# Patient Record
Sex: FEMALE | Race: WHITE | NOT HISPANIC OR LATINO | Employment: UNEMPLOYED | URBAN - METROPOLITAN AREA
[De-identification: names, ages, dates, MRNs, and addresses within clinical notes are randomized per-mention and may not be internally consistent; named-entity substitution may affect disease eponyms.]

---

## 2023-05-02 ENCOUNTER — APPOINTMENT (OUTPATIENT)
Dept: MRI IMAGING | Facility: CLINIC | Age: 53
End: 2023-05-02
Attending: EMERGENCY MEDICINE
Payer: COMMERCIAL

## 2023-05-02 ENCOUNTER — TRANSFERRED RECORDS (OUTPATIENT)
Dept: HEALTH INFORMATION MANAGEMENT | Facility: CLINIC | Age: 53
End: 2023-05-02

## 2023-05-02 ENCOUNTER — HOSPITAL ENCOUNTER (EMERGENCY)
Facility: CLINIC | Age: 53
Discharge: HOME | End: 2023-05-02
Attending: EMERGENCY MEDICINE | Admitting: EMERGENCY MEDICINE
Payer: COMMERCIAL

## 2023-05-02 VITALS
DIASTOLIC BLOOD PRESSURE: 85 MMHG | BODY MASS INDEX: 33.66 KG/M2 | WEIGHT: 190 LBS | SYSTOLIC BLOOD PRESSURE: 172 MMHG | TEMPERATURE: 98 F | HEART RATE: 84 BPM | OXYGEN SATURATION: 98 % | RESPIRATION RATE: 16 BRPM | HEIGHT: 63 IN

## 2023-05-02 DIAGNOSIS — H47.11 PAPILLEDEMA ASSOCIATED WITH INCREASED INTRACRANIAL PRESSURE: ICD-10-CM

## 2023-05-02 DIAGNOSIS — G93.2 IDIOPATHIC INTRACRANIAL HYPERTENSION: Primary | ICD-10-CM

## 2023-05-02 DIAGNOSIS — H53.9 VISION CHANGES: ICD-10-CM

## 2023-05-02 LAB
ANION GAP SERPL CALCULATED.3IONS-SCNC: 15 MMOL/L (ref 7–15)
ATRIAL RATE - MUSE: 65 BPM
BASOPHILS # BLD AUTO: 0 10E3/UL (ref 0–0.2)
BASOPHILS NFR BLD AUTO: 1 %
BUN SERPL-MCNC: 12.7 MG/DL (ref 6–20)
CALCIUM SERPL-MCNC: 10 MG/DL (ref 8.6–10)
CHLORIDE SERPL-SCNC: 103 MMOL/L (ref 98–107)
CREAT SERPL-MCNC: 0.53 MG/DL (ref 0.51–0.95)
DEPRECATED HCO3 PLAS-SCNC: 20 MMOL/L (ref 22–29)
DIASTOLIC BLOOD PRESSURE - MUSE: NORMAL MMHG
EOSINOPHIL # BLD AUTO: 0.1 10E3/UL (ref 0–0.7)
EOSINOPHIL NFR BLD AUTO: 2 %
ERYTHROCYTE [DISTWIDTH] IN BLOOD BY AUTOMATED COUNT: 13 % (ref 10–15)
GFR SERPL CREATININE-BSD FRML MDRD: >90 ML/MIN/1.73M2
GLUCOSE SERPL-MCNC: 105 MG/DL (ref 70–99)
HCT VFR BLD AUTO: 40.5 % (ref 35–47)
HGB BLD-MCNC: 13.3 G/DL (ref 11.7–15.7)
IMM GRANULOCYTES # BLD: 0 10E3/UL
IMM GRANULOCYTES NFR BLD: 0 %
INTERPRETATION ECG - MUSE: NORMAL
LYMPHOCYTES # BLD AUTO: 2.5 10E3/UL (ref 0.8–5.3)
LYMPHOCYTES NFR BLD AUTO: 42 %
MCH RBC QN AUTO: 28.2 PG (ref 26.5–33)
MCHC RBC AUTO-ENTMCNC: 32.8 G/DL (ref 31.5–36.5)
MCV RBC AUTO: 86 FL (ref 78–100)
MONOCYTES # BLD AUTO: 0.3 10E3/UL (ref 0–1.3)
MONOCYTES NFR BLD AUTO: 4 %
NEUTROPHILS # BLD AUTO: 3.1 10E3/UL (ref 1.6–8.3)
NEUTROPHILS NFR BLD AUTO: 51 %
NRBC # BLD AUTO: 0 10E3/UL
NRBC BLD AUTO-RTO: 0 /100
P AXIS - MUSE: 49 DEGREES
PLATELET # BLD AUTO: 286 10E3/UL (ref 150–450)
POTASSIUM SERPL-SCNC: 4.4 MMOL/L (ref 3.4–5.3)
PR INTERVAL - MUSE: 146 MS
QRS DURATION - MUSE: 82 MS
QT - MUSE: 410 MS
QTC - MUSE: 426 MS
R AXIS - MUSE: 69 DEGREES
RBC # BLD AUTO: 4.71 10E6/UL (ref 3.8–5.2)
SODIUM SERPL-SCNC: 138 MMOL/L (ref 136–145)
SYSTOLIC BLOOD PRESSURE - MUSE: NORMAL MMHG
T AXIS - MUSE: 43 DEGREES
TROPONIN T SERPL HS-MCNC: <6 NG/L
VENTRICULAR RATE- MUSE: 65 BPM
WBC # BLD AUTO: 6 10E3/UL (ref 4–11)

## 2023-05-02 PROCEDURE — 255N000002 HC RX 255 OP 636: Performed by: EMERGENCY MEDICINE

## 2023-05-02 PROCEDURE — 85025 COMPLETE CBC W/AUTO DIFF WBC: CPT | Performed by: EMERGENCY MEDICINE

## 2023-05-02 PROCEDURE — 36415 COLL VENOUS BLD VENIPUNCTURE: CPT | Performed by: EMERGENCY MEDICINE

## 2023-05-02 PROCEDURE — 250N000013 HC RX MED GY IP 250 OP 250 PS 637: Performed by: EMERGENCY MEDICINE

## 2023-05-02 PROCEDURE — 70549 MR ANGIOGRAPH NECK W/O&W/DYE: CPT

## 2023-05-02 PROCEDURE — 70553 MRI BRAIN STEM W/O & W/DYE: CPT

## 2023-05-02 PROCEDURE — 70544 MR ANGIOGRAPHY HEAD W/O DYE: CPT | Mod: XU

## 2023-05-02 PROCEDURE — 93005 ELECTROCARDIOGRAM TRACING: CPT

## 2023-05-02 PROCEDURE — 84484 ASSAY OF TROPONIN QUANT: CPT | Performed by: EMERGENCY MEDICINE

## 2023-05-02 PROCEDURE — A9585 GADOBUTROL INJECTION: HCPCS | Performed by: EMERGENCY MEDICINE

## 2023-05-02 PROCEDURE — 99285 EMERGENCY DEPT VISIT HI MDM: CPT | Mod: 25

## 2023-05-02 PROCEDURE — 80048 BASIC METABOLIC PNL TOTAL CA: CPT | Performed by: EMERGENCY MEDICINE

## 2023-05-02 PROCEDURE — 70546 MR ANGIOGRAPH HEAD W/O&W/DYE: CPT

## 2023-05-02 RX ORDER — ACETAZOLAMIDE 500 MG/1
500 CAPSULE, EXTENDED RELEASE ORAL 2 TIMES DAILY
Qty: 28 CAPSULE | Refills: 0 | Status: SHIPPED | OUTPATIENT
Start: 2023-05-02 | End: 2023-06-09

## 2023-05-02 RX ORDER — LORAZEPAM 1 MG/1
1 TABLET ORAL ONCE
Status: COMPLETED | OUTPATIENT
Start: 2023-05-02 | End: 2023-05-02

## 2023-05-02 RX ORDER — GADOBUTROL 604.72 MG/ML
10 INJECTION INTRAVENOUS ONCE
Status: COMPLETED | OUTPATIENT
Start: 2023-05-02 | End: 2023-05-02

## 2023-05-02 RX ADMIN — LORAZEPAM 1 MG: 1 TABLET ORAL at 12:07

## 2023-05-02 RX ADMIN — GADOBUTROL 10 ML: 604.72 INJECTION INTRAVENOUS at 15:06

## 2023-05-02 ASSESSMENT — ENCOUNTER SYMPTOMS
HEADACHES: 1
WEAKNESS: 0
SHORTNESS OF BREATH: 0
NUMBNESS: 0
DIARRHEA: 0
NAUSEA: 0
VOMITING: 0

## 2023-05-02 ASSESSMENT — ACTIVITIES OF DAILY LIVING (ADL)
ADLS_ACUITY_SCORE: 35
ADLS_ACUITY_SCORE: 35

## 2023-05-02 NOTE — ED TRIAGE NOTES
Pt has been having issues with her vision for past couple of weeks.  Eye doctor today sent her here for MRI, finding swelling in right eye.  Has had headaches for 2 weeks and dropping things.     Triage Assessment     Row Name 05/02/23 1120       Triage Assessment (Adult)    Airway WDL WDL       Respiratory WDL    Respiratory WDL WDL       Skin Circulation/Temperature WDL    Skin Circulation/Temperature WDL WDL       Peripheral/Neurovascular WDL    Peripheral Neurovascular WDL WDL       Cognitive/Neuro/Behavioral WDL    Cognitive/Neuro/Behavioral WDL WDL

## 2023-05-02 NOTE — ED PROVIDER NOTES
History     Chief Complaint:  Eye Problem     The history is provided by the patient.      Chacha Mae is a 52 year old female with a history of hypertension and hyperlipidemia who presents with vision changes which have gradually worsened since the onset 3 weeks ago. She reports that approximately 3 weeks ago, she began noticing that her glasses were not working as well as they had been. She followed up with her optometrist for this yesterday, and imaging studies were performed at that time. The imaging studies reportedly showed evidence of bilateral optic nerve swelling, right greater than left. She was instructed to follow up with ophthalmology today which she did this morning at [facility], and she was sent from there to the ED for MRI imaging. Presently, she reports that her vision is blurred bilaterally, right greater than left. She notes experiencing intermittent headaches for the past 3 weeks as well, but she attributes this to starting an intermittent fasting diet 4 weeks ago. She also mentions that she has had some chest pain which she attributes to anxiety. She has had no chest pain today. Denies shortness of breath, nausea, vomiting, diarrhea, numbness/tingling, weakness. She endorses history of hyperlipidemia for which she takes a statin medication but denies history of hypertension, MI or heart disease, or stroke.    Independent Historian:    None - Patient Only    ROS:  Review of Systems   Eyes: Positive for visual disturbance.   Respiratory: Negative for shortness of breath.    Cardiovascular: Negative for chest pain.   Gastrointestinal: Negative for diarrhea, nausea and vomiting.   Neurological: Positive for headaches. Negative for weakness and numbness.   All other systems reviewed and are negative.    Allergies:  Tilactase    Medications:    Adderall  Lipitor    Past Medical History:    Depression  ADHD  IBS with constipation  Obesity  Hyperlipidemia  PTSD  Hypertension   Mitral  "valve disorders  Cardiac murmur  Adjustment disorder with depressed mood  Intussusception of intestine  Chronic nausea  Migraine    Past Surgical History:    [place] teeth extraction      Family History:    Mother: breast cancer, hyperlipidemia, diabetes  Father: leukemia, bladder cancer, hypertension, hyperlipidemia, diabetes, mitral valve prolapse, heart murmur    Social History:  The patient presents to the ED with a friend.  The patient presents to the ED via private car.     Physical Exam     Patient Vitals for the past 24 hrs:   BP Temp Pulse Resp SpO2 Height Weight   05/02/23 1119 (!) 172/85 98  F (36.7  C) 84 16 98 % 1.6 m (5' 3\") 86.2 kg (190 lb)      Physical Exam  General: Alert, appears well-developed and well-nourished. Cooperative.     In mild distress  HEENT:  Head:  Atraumatic  Ears:  External ears are normal  Mouth/Throat:  Oropharynx is without erythema or exudate and mucous membranes are moist.   Eyes:   Conjunctivae normal and EOM are normal. No scleral icterus.    Pupils are equal, round, and reactive to light.   Neck:   Normal range of motion. Neck supple.  CV:  Normal rate, regular rhythm, normal heart sounds and radial pulses are 2+ and symmetric.  No murmur.  Resp:  Breath sounds are clear bilaterally    Non-labored, no retractions or accessory muscle use  GI:  Abdomen is soft, no distension, no tenderness. No rebound or guarding.    MS:  Normal range of motion. No edema.    Normal strength in all 4 extremities.     Back atraumatic.    No midline cervical, thoracic, or lumbar tenderness  Skin:  Warm and dry.  No rash or lesions noted.  Neuro: Alert. Normal strength.  Sensation intact in all 4 extremities. GCS: 15.  Cranial nerves 2-12 intact.  Psych:  Normal mood and affect.    Emergency Department Course     ECG  ECG taken at 1156, ECG read at 1211  Normal sinus rhythm  Cannot rule out anterior infarct, age undetermined  Abnormal ECG  No significant change as compared to prior, dated " 11/24/2015.  Rate 65 bpm. UT interval 146 ms. QRS duration 82 ms. QT/QTc 410/426 ms. P-R-T axes 49 69 43.    Imaging:  MRA Neck (Carotids) wo & w Contrast   Final Result   IMPRESSION: Unremarkable MRA of the neck.      SLAVA QUINTANILLA MD            SYSTEM ID:  [id number]      MR Brain and Orbits w/o & w Contrast   Final Result   IMPRESSION:   1. Partially empty sella turcica, distention of the bilateral optic   nerve sheaths, and bilateral transverse sinus narrowing raises concern   for idiopathic intracranial hypertension. Further workup could be   pursued.   2. Otherwise, unremarkable MRI of the brain and orbits.      SLAVA QUINTANILLA MD            SYSTEM ID:  [id number]      MRV Brain wo & w Contrast   Final Result   IMPRESSION: Bilateral transverse sinus narrowing raises concern for   idiopathic intracranial hypertension.      SLAVA QUINTANILLA MD            SYSTEM ID:  [id number]      MRA Brain ([place]) wo Contrast   Final Result   IMPRESSION: Unremarkable MRA of the head.      SLAVA QUINTANILLA MD            SYSTEM ID:  [id number]      Report per radiology    Laboratory:  Labs Ordered and Resulted from Time of ED Arrival to Time of ED Departure   BASIC METABOLIC PANEL - Abnormal       Result Value    Sodium 138      Potassium 4.4      Chloride 103      Carbon Dioxide (CO2) 20 (*)     Anion Gap 15      Urea Nitrogen 12.7      Creatinine 0.53      Calcium 10.0      Glucose 105 (*)     GFR Estimate >90     TROPONIN T, HIGH SENSITIVITY - Normal    Troponin T, High Sensitivity <6     CBC WITH PLATELETS AND DIFFERENTIAL    WBC Count 6.0      RBC Count 4.71      Hemoglobin 13.3      Hematocrit 40.5      MCV 86      MCH 28.2      MCHC 32.8      RDW 13.0      Platelet Count 286      % Neutrophils 51      % Lymphocytes 42      % Monocytes 4      % Eosinophils 2      % Basophils 1      % Immature Granulocytes 0      NRBCs per 100 WBC 0      Absolute Neutrophils 3.1      Absolute Lymphocytes 2.5      Absolute Monocytes 0.3       Absolute Eosinophils 0.1      Absolute Basophils 0.0      Absolute Immature Granulocytes 0.0      Absolute NRBCs 0.0       Emergency Department Course & Assessments:       Interventions:  Medications   LORazepam (ATIVAN) tablet 1 mg (1 mg Oral $Given 5/2/23 1207)   gadobutrol (GADAVIST) injection 10 mL (10 mLs Intravenous $Given 5/2/23 1506)      Independent Interpretation (X-rays, CTs, rhythm strip):  None     Consultations/Discussion of Management or Tests:  1515 I spoke with Dr. Stallings from neuroradiology regarding the patient's imaging findings.  1525 I spoke with Dr. Matute from neurology regarding the patient's presentation and plan of care. Dr. Matute recommqends neuro-ophthalmology consultation.  1547 I spoke with Dr. Ventura from ophthalmology regarding the patient's presentation and plan of care.     Social Determinants of Health affecting care:  None    Assessments:  1142 I obtained history and performed an examination of the patient.   1612 The patient was rechecked and updated. Discussed results and plan of care.     Disposition:  The patient was discharged to home.     Impression & Plan      Medical Decision Making:  Patient is a 52-year-old female who presents with approximately 2 weeks of vision change particularly to the right eye.  She was sent from ophthalmology clinic this morning with concerns for papilledema bilaterally and recommendations for MRI imaging to evaluate for other sinister intracranial abnormalities.  We did obtain MRI imaging of the brain including venography of the brain with and without contrast as well as angiography of the neck and brain.  Thankfully no sinister intracranial findings were identified such as tumor, intracranial hemorrhage, or vascular stenosis.  There was a partially empty sella turcica distention of the bilateral optic nerve sheaths and bilateral transverse sinus narrowing raising concern for idiopathic intracranial hypertension.  This  certainly would explain the patient's vision changes over the last several weeks and in discussion with our on-call neurologist and on-call ophthalmologist will initiate the patient on oral acetazolamide with plans for close follow-up in outpatient neuro-ophthalmology clinic.  Patient will initially see ophthalmology on Thursday of this week in clinic for reassessment and close follow-up in neuro Optho clinic.  We discussed initiation of acetazolamide and starting the medication of 500 mg twice daily.  Additionally the patient understands return precautions if she were to develop sudden vision changes or vision loss and may require more emergent intervention such as lumbar puncture and potential admission to the hospital at that time.  I do not think that there is an emergent indication for lumbar puncture here today particularly as the patient's vision changes happened over the last 2-week period of time.  She has no other focal neurologic deficits here today.  Laboratory work unremarkable.  EKG showed no concerning ischemic changes.  I do suspect the symptoms seem most consistent with idiopathic intracranial hypertension patient was provided with education regarding this diagnosis.  She understands the importance of follow-up with ophthalmology/neuro-ophthalmology in the near future later this week.  After all questions answered & return precautions understood, discharged home.    Diagnosis:    ICD-10-CM    1. Idiopathic intracranial hypertension  G93.2       2. Vision changes  H53.9       3. Papilledema associated with increased intracranial pressure  H47.11          Discharge Medications:  New Prescriptions    ACETAZOLAMIDE (DIAMOX SEQUEL) 500 MG 12 HR CAPSULE    Take 1 capsule (500 mg) by mouth 2 times daily for 14 days      Scribe Disclosure:  Venus HODGSON, am serving as a scribe at 11:55 AM on 5/2/2023 to document services personally performed by Jin Mojica MD based on my observations and the provider's  statements to me.     5/2/2023   Jin Mojica MD White, Scott, MD  05/02/23 7192

## 2023-05-04 ENCOUNTER — OFFICE VISIT (OUTPATIENT)
Dept: OPHTHALMOLOGY | Facility: CLINIC | Age: 53
End: 2023-05-04
Attending: OPHTHALMOLOGY
Payer: COMMERCIAL

## 2023-05-04 DIAGNOSIS — H47.10 OPTIC DISC EDEMA: Primary | ICD-10-CM

## 2023-05-04 PROCEDURE — G0463 HOSPITAL OUTPT CLINIC VISIT: HCPCS | Performed by: STUDENT IN AN ORGANIZED HEALTH CARE EDUCATION/TRAINING PROGRAM

## 2023-05-04 PROCEDURE — 92133 CPTRZD OPH DX IMG PST SGM ON: CPT | Performed by: STUDENT IN AN ORGANIZED HEALTH CARE EDUCATION/TRAINING PROGRAM

## 2023-05-04 PROCEDURE — 99204 OFFICE O/P NEW MOD 45 MIN: CPT | Mod: GC | Performed by: OPHTHALMOLOGY

## 2023-05-04 PROCEDURE — 92133 CPTRZD OPH DX IMG PST SGM ON: CPT | Mod: 26 | Performed by: OPHTHALMOLOGY

## 2023-05-04 ASSESSMENT — VISUAL ACUITY
OD_SC: 20/30
OS_PH_SC: 20/50
METHOD: SNELLEN - LINEAR
OS_SC: 20/60

## 2023-05-04 ASSESSMENT — SLIT LAMP EXAM - LIDS
COMMENTS: NORMAL
COMMENTS: NORMAL

## 2023-05-04 ASSESSMENT — EXTERNAL EXAM - RIGHT EYE: OD_EXAM: NORMAL

## 2023-05-04 ASSESSMENT — CONF VISUAL FIELD
OS_SUPERIOR_NASAL_RESTRICTION: 0
OD_SUPERIOR_NASAL_RESTRICTION: 0
OD_SUPERIOR_TEMPORAL_RESTRICTION: 0
OS_SUPERIOR_TEMPORAL_RESTRICTION: 0
OS_INFERIOR_TEMPORAL_RESTRICTION: 0
OD_NORMAL: 1
OD_INFERIOR_NASAL_RESTRICTION: 0
OD_INFERIOR_TEMPORAL_RESTRICTION: 0
OS_NORMAL: 1
OS_INFERIOR_NASAL_RESTRICTION: 0

## 2023-05-04 ASSESSMENT — EXTERNAL EXAM - LEFT EYE: OS_EXAM: NORMAL

## 2023-05-04 ASSESSMENT — TONOMETRY
IOP_METHOD: TONOPEN
OS_IOP_MMHG: 21
OD_IOP_MMHG: 20

## 2023-05-04 NOTE — NURSING NOTE
Chief Complaints and History of Present Illnesses   Patient presents with     IIH evaluation      IIH evaluation  Had exam at [facility] sent to ED   Flashes no floaters pain when bending level 5   Vision has been off x years  Marie TAYLOR 9:16 AM May 4, 2023        Chief Complaint(s) and History of Present Illness(es)     II evaluation             Comments: II evaluation  Had exam at [facility] sent to ED   Flashes no floaters pain when bending level 5   Vision has been off x years  Marie TAYLOR 9:16 AM May 4, 2023

## 2023-05-04 NOTE — PROGRESS NOTES
"HPI     IIH evaluation      Additional comments: IIH evaluation  Had exam at [facility] sent to ED   Flashes no floaters pain when bending level 5   Vision has been off x years  Marie Geiger COA 9:16 AM May 4, 2023             Last edited by Marie Geiger on 5/4/2023  9:16 AM.          Ophthalmology Acute Clinic       HPI:   Chacha Mae is a 52 year old female who presents for evaluation of papilledema. Patient states that for the past 5 years, she has had focal headaches where things feel \"tight\", and then it will suddenly dissipate and spread around her head. She feels like her vision has been blurry since 2016. However did not have dilated eye exam until this week. She endorses pulstaile tinnitus and positional vision changes for years associated with the headaches.     Hx of migraines with auras.    Past Ocular history:   - Glasses:Yes  - Contact lens wear: Yes  - Ocular medical history: Aniscoria   - Ocular surgical history: None  - Current eye drops: None    PMH:   History reviewed. No pertinent past medical history.      FH: No family history of glaucoma, AMD, or other ocular disease      Review of systems for the eyes was negative other than the pertinent positives/negatives listed in the HPI.      Imaging:   MRI Brain and Orbits  IMPRESSION:  1. Partially empty sella turcica, distention of the bilateral optic nerve sheaths, and bilateral transverse sinus narrowing raises concern for idiopathic intracranial hypertension. Further workup could be pursued.  2. Otherwise, unremarkable MRI of the brain and orbits.    MRV Brain   IMPRESSION: Bilateral transverse sinus narrowing raises concern for idiopathic intracranial hypertension.    Assessment & Plan      Chacha Mae is a 52 year old female with the following diagnoses:     # Idiopathic Intracranial Headache   - 53 yo female with a history of 6 years of headaches associated with pulsatile tinnitus, and positional visual changes.   - BMI 33  - VF: " ?nasal step with enlargement of the physiological blind spot right eye and inferior-nasal arcuate defect left eye   - MRI: bony changes to the sella consistent chronic elevated intracranial pressure. Mild distention of the optic nerve sheath.    - MRV: Bilateral transverse sinus narrowing    - Exam shows bilateral optic disc swelling  - OCT RNFL (5/4/23):   - right eye: borderline nasal thinning   - left eye:  Superior and nasal quadrant thinning   - Given the historical context of intracranial hypertension symptoms in conjunction with optic disc swelling and evidence of elevated intracranial hypertension of MR imaging, there is sufficient evidence to suggest intracranial hypertension. She does not fit the typical demographic for IIH given her age. However, there is no evidence of mass effect, no cavernous sinus thrombosis. Unlikely due to pachy meningeal process such as lymphoma or leukemia.  Will plan for LP.    Plan:  - Lumbar puncture - scheduled  - Cont Diamox as prescribed    Follow up:  Follow up with Dr. Eng in June as scheduled    Patient seen with Dr. Tovar.     Thank you for entrusting us with your care    Joe Ventura M.D.  PGY-2 Resident Physician  Department of Ophthalmology  05/04/23 9:25 AM

## 2023-05-05 ENCOUNTER — TELEPHONE (OUTPATIENT)
Dept: MEDSURG UNIT | Facility: CLINIC | Age: 53
End: 2023-05-05
Payer: COMMERCIAL

## 2023-05-08 ENCOUNTER — HOSPITAL ENCOUNTER (OUTPATIENT)
Dept: GENERAL RADIOLOGY | Facility: CLINIC | Age: 53
Discharge: HOME | End: 2023-05-08
Attending: OPHTHALMOLOGY
Payer: COMMERCIAL

## 2023-05-08 ENCOUNTER — HOSPITAL ENCOUNTER (OUTPATIENT)
Facility: CLINIC | Age: 53
Discharge: HOME | End: 2023-05-08
Admitting: PHYSICIAN ASSISTANT
Payer: COMMERCIAL

## 2023-05-08 ENCOUNTER — MYC MEDICAL ADVICE (OUTPATIENT)
Dept: OPHTHALMOLOGY | Facility: CLINIC | Age: 53
End: 2023-05-08

## 2023-05-08 VITALS
TEMPERATURE: 98.6 F | RESPIRATION RATE: 16 BRPM | SYSTOLIC BLOOD PRESSURE: 130 MMHG | HEIGHT: 62 IN | DIASTOLIC BLOOD PRESSURE: 70 MMHG | BODY MASS INDEX: 34.96 KG/M2 | WEIGHT: 190 LBS | OXYGEN SATURATION: 98 % | HEART RATE: 63 BPM

## 2023-05-08 DIAGNOSIS — H47.10 OPTIC DISC EDEMA: ICD-10-CM

## 2023-05-08 DIAGNOSIS — H47.10 OPTIC DISC EDEMA: Primary | ICD-10-CM

## 2023-05-08 LAB
APPEARANCE CSF: CLEAR
COLOR CSF: COLORLESS
GLUCOSE CSF-MCNC: 67 MG/DL (ref 40–70)
PROT CSF-MCNC: 29.1 MG/DL (ref 15–45)
RBC # CSF MANUAL: 1 /UL (ref 0–2)
TUBE # CSF: 4
WBC # CSF MANUAL: 0 /UL (ref 0–5)

## 2023-05-08 PROCEDURE — 88108 CYTOPATH CONCENTRATE TECH: CPT | Mod: TC

## 2023-05-08 PROCEDURE — 88184 FLOWCYTOMETRY/ TC 1 MARKER: CPT

## 2023-05-08 PROCEDURE — 62328 DX LMBR SPI PNXR W/FLUOR/CT: CPT

## 2023-05-08 PROCEDURE — 82945 GLUCOSE OTHER FLUID: CPT

## 2023-05-08 PROCEDURE — 84157 ASSAY OF PROTEIN OTHER: CPT

## 2023-05-08 PROCEDURE — 89050 BODY FLUID CELL COUNT: CPT

## 2023-05-08 PROCEDURE — 88108 CYTOPATH CONCENTRATE TECH: CPT | Mod: 26 | Performed by: PATHOLOGY

## 2023-05-08 PROCEDURE — 999N000154 HC STATISTIC RADIOLOGY XRAY, US, CT, MAR, NM

## 2023-05-08 PROCEDURE — 250N000009 HC RX 250: Performed by: OPHTHALMOLOGY

## 2023-05-08 PROCEDURE — 88188 FLOWCYTOMETRY/READ 9-15: CPT | Performed by: PATHOLOGY

## 2023-05-08 RX ORDER — ACETAZOLAMIDE 500 MG/1
1000 CAPSULE, EXTENDED RELEASE ORAL 2 TIMES DAILY
Qty: 240 CAPSULE | Refills: 1 | Status: SHIPPED | OUTPATIENT
Start: 2023-05-08 | End: 2023-08-25

## 2023-05-08 RX ADMIN — LIDOCAINE HYDROCHLORIDE 4 ML: 10 INJECTION, SOLUTION EPIDURAL; INFILTRATION; INTRACAUDAL; PERINEURAL at 08:28

## 2023-05-08 ASSESSMENT — ACTIVITIES OF DAILY LIVING (ADL): ADLS_ACUITY_SCORE: 35

## 2023-05-08 NOTE — PROGRESS NOTES
Care Suites Admission Nursing Note    Patient Information  Name: Chacha Mae  Age: 52 year old  Reason for admission: Lumbar puncture  Care Suites arrival time: 0730    Visitor Information  Name: Bogdan  Informed of visitor restrictions: Yes  2 visitor allowed per patient   Visitor must wear a mask    Patient Admission/Assessment   Pre-procedure assessment complete: Yes  If abnormal assessment/labs, provider notified: N/A  NPO: N/A  Medications held per instructions/orders: N/A  Consent: obtained  If applicable, pregnancy test status: deferred  Patient oriented to room: Yes  Education/questions answered: Yes  Plan/other: getting prepped for their procedure    Discharge Planning  Discharge name/phone number: Bogdan  [phone]  Overnight post sedation caregiver: Bogdan  Discharge location: home    Kaity Mccallum RN

## 2023-05-08 NOTE — PROGRESS NOTES
Care Suites Post Procedure Note    Patient Information  Name: Chacha Mae  Age: 52 year old    Post Procedure  Time patient returned to Care Suites: 1926  Concerns/abnormal assessment: none. VSS. Denies pain. Lumbar back site D/I. Taking water    If abnormal assessment, provider notified: N/A  Plan/Other: instructed on flat bedrest for 1 hour and encouraged fluids. Call light in reach.    Karon Bhardwaj RN

## 2023-05-08 NOTE — PROGRESS NOTES
Care Suites Discharge Nursing Note    Patient Information  Name: Chacha Mae  Age: 52 year old    Discharge Education:  Discharge instructions reviewed: Yes  Additional education/resources provided: N/A  Patient/patient representative verbalizes understanding: Yes  Patient discharging on new medications: No  Medication education completed: N/A    Discharge Plans:   Discharge location: home  Discharge ride contacted: Yes  Approximate discharge time: 1000    Discharge Criteria:  Discharge criteria met and vital signs stable: Yes    Patient Belongs:  Patient belongings returned to patient: Yes    Kaity Mccallum RN

## 2023-05-08 NOTE — PROCEDURES
[facility]    Procedure: LP at L3-L4    Date/Time: 5/8/2023 9:03 AM    Performed by: Ag Hampton PA-C  Authorized by: Ag Hampton PA-C      UNIVERSAL PROTOCOL   Site Marked: Yes  Prior Images Obtained and Reviewed:  Yes  Required items: Required blood products, implants, devices and special equipment available    Patient identity confirmed:  Verbally with patient  Patient was reevaluated immediately before administering moderate or deep sedation or anesthesia  Confirmation Checklist:  Patient's identity using two indicators, relevant allergies, procedure was appropriate and matched the consent or emergent situation and correct equipment/implants were available  Time out: Immediately prior to the procedure a time out was called    Universal Protocol: the Joint Commission Universal Protocol was followed    Preparation: Patient was prepped and draped in usual sterile fashion       ANESTHESIA    Local Anesthetic: Lidocaine 1% without epinephrine      SEDATION    Patient Sedated: No    See dictated procedure note for full details.    PROCEDURE  Describe Procedure: Opening pressure of 30 cm of water    Collected 25 ml of clear csf    Closing pressure of 12 cm of water  Patient Tolerance:  Patient tolerated the procedure well with no immediate complications  Length of time physician/provider present for 1:1 monitoring during sedation: 0

## 2023-05-08 NOTE — DISCHARGE INSTRUCTIONS
Lumbar Puncture/Blood Patch Discharge Instructions     After you go home:    You may resume your normal diet  Continue to drink at least 8 ounces of fluid every 1-2 hours until bedtime tonight and continue to drink extra fluids for the next 2 days  Caffeinated beverages may help prevent or reduce spinal headaches    Care of Puncture Site:    If there is a bandaid - you may remove it tomorrow morning  You may shower tomorrow  No tub baths, whirlpools or swimming pool for 48 hours  Use ice packs as needed for discomfort     Activity - to help prevent spinal headache or spinal fluid leakage:    Minimize your activity today. Bedrest - lying flat - is recommended for the rest of the day to prevent or decrease the chance of getting a spinal headache.  You can be up to the bathroom and for meals.  Resume normal activities tomorrow.   Avoid strenuous activity for the next 2 days  Do not drive a vehicle until tomorrow     Medicines:    You may resume all medications, including blood thinners  Resume your Warfarin/Coumadin at your regular dose today. Follow up with your provider to have your INR rechecked  Resume your Platelet Inhibitors and Aspirin tomorrow at your regular dose  For minor discomfort, you may take Acetaminophen (Tylenol) or Ibuprofen (Advil)            Call the doctor who ordered this procedure if:    Your headache becomes worse or is severe. (A minor headache is not unusual)  You have nausea or vomiting  The site is red, swollen, hot or tender  You have chills or a fever greater than 101 F (38 C)  Increase in pain, weakness or numbness  Stiff neck  Any questions or concerns    What to watch for:    A spinal headache can develop after this type of procedure.  It is described as a dull, throbbing headache, usually appearing within 48 hours after the procedure, that worsens when you are sitting upright or standing, but improves or goes away when you lie down. Most spinal headaches resolve on their own.  If you  believe that you may be experiencing a spinal headache, continue bedrest for 2-4 hours and drink plenty of fluids.  If your symptoms persist for 24 hours or more, call your doctor who ordered your procedure.  It can be normal to have some bruising or slight swelling at the injection site.      If you have questions or concerns call:            [facility] Radiology Dept @ [phone]                                between 8am-4:30pm Mon-Fri    If you have urgent questions outside of these normal business hours, please contact the [facility] on call doctor @ [phone]    The provider who performed your procedure was _Ag CRUZ____.

## 2023-05-09 LAB

## 2023-06-02 ENCOUNTER — HEALTH MAINTENANCE LETTER (OUTPATIENT)
Age: 53
End: 2023-06-02

## 2023-06-09 ENCOUNTER — OFFICE VISIT (OUTPATIENT)
Dept: OPHTHALMOLOGY | Facility: CLINIC | Age: 53
End: 2023-06-09
Attending: OPHTHALMOLOGY
Payer: COMMERCIAL

## 2023-06-09 ENCOUNTER — MYC MEDICAL ADVICE (OUTPATIENT)
Dept: OPHTHALMOLOGY | Facility: CLINIC | Age: 53
End: 2023-06-09
Payer: COMMERCIAL

## 2023-06-09 DIAGNOSIS — H53.10 SUBJECTIVE VISUAL DISTURBANCE: ICD-10-CM

## 2023-06-09 DIAGNOSIS — H47.10 PAPILLEDEMA: ICD-10-CM

## 2023-06-09 PROCEDURE — G0463 HOSPITAL OUTPT CLINIC VISIT: HCPCS | Performed by: OPHTHALMOLOGY

## 2023-06-09 PROCEDURE — 92134 CPTRZ OPH DX IMG PST SGM RTA: CPT | Performed by: OPHTHALMOLOGY

## 2023-06-09 PROCEDURE — 99215 OFFICE O/P EST HI 40 MIN: CPT | Performed by: OPHTHALMOLOGY

## 2023-06-09 PROCEDURE — 92133 CPTRZD OPH DX IMG PST SGM ON: CPT | Performed by: OPHTHALMOLOGY

## 2023-06-09 PROCEDURE — 99207 OCT RETINA SPECTRALIS OU (BOTH EYE): CPT | Mod: 26 | Performed by: OPHTHALMOLOGY

## 2023-06-09 PROCEDURE — 92083 EXTENDED VISUAL FIELD XM: CPT | Performed by: OPHTHALMOLOGY

## 2023-06-09 RX ORDER — SODIUM BICARBONATE 650 MG/1
1300 TABLET ORAL 2 TIMES DAILY
Qty: 120 TABLET | Refills: 3 | Status: SHIPPED | OUTPATIENT
Start: 2023-06-09

## 2023-06-09 RX ORDER — IBUPROFEN 200 MG
800 TABLET ORAL
COMMUNITY

## 2023-06-09 RX ORDER — QUETIAPINE 150 MG/1
TABLET, FILM COATED, EXTENDED RELEASE ORAL
COMMUNITY
Start: 2023-06-07

## 2023-06-09 RX ORDER — SEMAGLUTIDE 0.5 MG/.5ML
INJECTION, SOLUTION SUBCUTANEOUS
COMMUNITY
Start: 2023-05-27

## 2023-06-09 RX ORDER — SERTRALINE HYDROCHLORIDE 100 MG/1
TABLET, FILM COATED ORAL
COMMUNITY
Start: 2023-05-03

## 2023-06-09 RX ORDER — ATORVASTATIN CALCIUM 20 MG/1
1 TABLET, FILM COATED ORAL DAILY
COMMUNITY
Start: 2023-04-10

## 2023-06-09 ASSESSMENT — CONF VISUAL FIELD
OD_INFERIOR_NASAL_RESTRICTION: 0
OD_NORMAL: 1
OS_SUPERIOR_TEMPORAL_RESTRICTION: 0
OS_INFERIOR_TEMPORAL_RESTRICTION: 0
OS_NORMAL: 1
OD_SUPERIOR_NASAL_RESTRICTION: 0
OS_SUPERIOR_NASAL_RESTRICTION: 0
METHOD: COUNTING FINGERS
OD_INFERIOR_TEMPORAL_RESTRICTION: 0
OD_SUPERIOR_TEMPORAL_RESTRICTION: 0
OS_INFERIOR_NASAL_RESTRICTION: 0

## 2023-06-09 ASSESSMENT — CUP TO DISC RATIO
OD_RATIO: -
OS_RATIO: -

## 2023-06-09 ASSESSMENT — SLIT LAMP EXAM - LIDS
COMMENTS: NORMAL
COMMENTS: NORMAL

## 2023-06-09 ASSESSMENT — VISUAL ACUITY
OD_SC: 20/25
OD_SC+: -2
METHOD: SNELLEN - LINEAR
OS_SC: 20/25

## 2023-06-09 ASSESSMENT — EXTERNAL EXAM - RIGHT EYE: OD_EXAM: NORMAL

## 2023-06-09 ASSESSMENT — TONOMETRY
OD_IOP_MMHG: 19
OS_IOP_MMHG: 16
IOP_METHOD: ICARE

## 2023-06-09 ASSESSMENT — EXTERNAL EXAM - LEFT EYE: OS_EXAM: NORMAL

## 2023-06-09 NOTE — PROGRESS NOTES
Neuro-Ophthalmology Follow-Up Visit  Jun 9, 2023    Assessment:    Chacha Mae is a pleasant 52 year old female with the following diagnoses:     1. Idiopathic intracranial hypertension    Patient with chronic positional headache and other symptoms suggestive of intracranial hypertension for several years. She denies prior history of optic disc edema, but had not seen an eye doctor since the start of COVID. She was seen in 5/2023 with significant disc edema OD and optic atrophy OS, raising suspicion for a Foster Apollo picture. Her MRI found no mass or indication of an infiltrative process to account for these changes. Both the MRI and MRV showed stigmata of intracranial hypertension without suggestion of a secondary etiology. Although her age is atypical for idiopathic intracranial hypertension, I am reassured that there was no indication of fistula on MRA and that her CSF studies (including cytology and flow cytometry) were unremarkable.     Today she has some fluctuating symptoms, but no definitive progression of afferent deficits. It is difficult to compare GTOP to outside HVF. I am concerned, however, that her RNFL thickness has not appreciably changed in either eye after careful resegmentation of these images. We discussed that escalation of medical therapy would be appropriate before considering other options. Will increase Diamox and start Topamax as below, with sodium bicarbonate to ameliorate side-effects. We discussed that Topiramate is a teratogen and that adequate contraception is essential, even in perimenopause, and that other risks include angle closure glaucoma and nephrolithiasis. She verbalizes understanding.     Alternative diagnoses include sequential NAION, which I do not suspect based on stable-appearing RNFL edema in the right eye and with her MRI/LP corroborating intracranial hypertension. Other papillopathy or papillitis is conceivable, but less likely for similar reasons.      Plan:    Increase Diamox from 1000 mg ER BID to 1500 AM, 1000 PM    Start Topamax 50 mg QHS for 1 week, then increase to 50 mg BID    Start sodium bicarbonate 1300 mg BID    Follow-up: 6 weeks with Dr. Martinez or Roxanne. We discussed return precautions and modes of contacting our service for any changing symptoms or other questions/concerns.              Summary of prior care:    Patient was initially seen by me on 23 in the Acute clinic for evaluation of suspected intracranial hypertension with bilateral optic disc edema. She had a recent MRI with stigmata of intracranial hypertension. Her exam showed more prominent edema OD with apparent atrophy OS. Given her mild afferent deficits, we deferred Diamox pending LP.    Her presenting symptoms were chronic positional headache, blurry vision, and pulsatile tinnitus ongoing for about the past four years. She denied exposure to tetracyclines and retinoids as well as history of anemia. Her last eye exam had been performed before .    Interval HPI (since last visit):    Patient reports a sensation of visual fatigue when looking at screens along with difficulty focusing while reading. She recalls transient blurry vision after our last visit, but reports this has improved. She is having headaches about every other week. Her pulsatile tinnitus remains about the same.     She has been taking Diamox 1000 mg ER BID with side effects including severe fatigue and dehydration after being out in the sun. This was exacerbated during her menstrual period. She reports it has been difficult to keep up with hydration but feels better today. She also also has tingling in lips/hands/feet.     Of note, she recently started Wegovy three weeks ago. She lost 10 lbs over 2 weeks.       Review of new or significant outside clinical notes/testin/8/23: IR LP with OP 30 cm H2O, no nucleated cells, protein 29.1, glucose 67, normal cytology/flow cytometry      23: MRI  Brain/Orbits WWO + MRV Head + MRA Head/Neck    IMPRESSION:  1. Partially empty sella turcica, distention of the bilateral optic  nerve sheaths, and bilateral transverse sinus narrowing raises concern  for idiopathic intracranial hypertension. Further workup could be  pursued.  2. Otherwise, unremarkable MRI of the brain and orbits.    IMPRESSION: Bilateral transverse sinus narrowing raises concern for  idiopathic intracranial hypertension.    IMPRESSION: Unremarkable MRA of the head.    IMPRESSION: Unremarkable MRA of the neck.       5/2/23: Hgb 13.3      5/2/23: Outside clinic [facility] 24-2          Exam:    Base Eye Exam     Visual Acuity (Snellen - Linear)       Right Left    Dist sc 20/25 -2 20/25          Tonometry ([facility]are, 3:10 PM)       Right Left    Pressure 19 16          Pupils       Pupils APD    Right PERRL None    Left PERRL None          Visual Fields (Counting fingers)       Left Right     Full Full          Neuro/Psych     Oriented x3: Yes    Mood/Affect: Normal          Dilation     Both eyes: 1.0% Mydriacyl, 2.5% Leo Synephrine @ 3:22 PM            Additional Tests     Color       Right Left    Ishihara 11/11 11/11            Slit Lamp and Fundus Exam     External Exam       Right Left    External Normal Normal          Slit Lamp Exam       Right Left    Lids/Lashes Normal Normal    Conjunctiva/Sclera White and quiet White and quiet    Cornea Clear Clear    Anterior Chamber Deep and quiet Deep and quiet    Iris Dilated Dilated    Lens Clear Clear    Anterior Vitreous Normal Normal          Fundus Exam       Right Left    Disc Marked 360 elevation, minimal vessel obscuration Mildly blurred nasal margin, mild pallor    C/D Ratio - -    Macula Normal Normal    Vessels Normal Normal    Periphery Normal Normal                 Tests ordered and interpreted today:     Glaucoma Top OU    Comparison: Outside [facility] 24/2 from 5/2/2023  OD: Inferonasal arcuate with inferotemporal depressions, grossly stable  OS:  Inferonasal arcuate and scattered peripheral depressions elsewhere, grossly stable     OCT Retina Spectralis OU (both eyes)    Equivocal mild GCL thinning OU     Linked Images                      OCT Optic Nerve RNFL Spectralis OU (both eyes)    Comparison: 5/4/2023  OD: Stable RNFL edema at 125 (after careful resegmentation of present and prior scans)  OS: Stable, predominantly superior and nasal atrophy with average thickness 74 from 77 (good automatic segmentation)     Linked Images                                 60 minutes were spent on the date of service reviewing outside records and the chart, taking a history, examining the patient, counseling the patient, and otherwise managing the patient as documented above.     Medical student: Nora Fowler, MS4    Attending Physician Attestation:  Complete documentation of historical and exam elements from today's encounter can be found in the full encounter summary report (not reduplicated in this progress note).  A medical student was involved in the care of the patient. I was present with the medical student who participated in the service and in the documentation of the note. I have verified the history and personally performed the physical exam and medical decision making.  I personally reviewed the relevant tests, images, and reports as documented above. I formulated and edited as necessary the assessment and plan and discussed the findings and management plan with the patient and family.    - Max Eng MD PhD

## 2023-06-09 NOTE — Clinical Note
Can we call this patient to schedule follow-up with Dr. Martinez or Roxanne in 6 weeks. Watching closely given persistent disc edema and atypical demographic. Thanks!

## 2023-06-09 NOTE — TELEPHONE ENCOUNTER
Patient offered add-on this week due to cancellations. If patient returns call, please schedule for any appointment with me from 12-3pm today.

## 2023-06-09 NOTE — NURSING NOTE
Chief Complaint(s) and History of Present Illness(es)     New Patient    In both eyes.  Charactertized as  blurred vision.  Since onset it is stable.  Associated symptoms include headache, photophobia and floaters.  Negative for double vision and eye pain.  Pain was noted as 0/10. Additional comments: Chacha Mae is a 52 year old female sent for consultation by Dr. Ventura for suspected intracranial hypertension with bilateral optic disc edema.     Chacha reports no improvement in her vision since her last visit here.  She does note light sensitivity and trouble with focusing.  She is having blurry vision and seeing blacked out spots in both eyes.  She does get a headache once every other week.    CHARLES Acharya 6/9/2023 3:04 PM

## 2023-06-12 ENCOUNTER — TELEPHONE (OUTPATIENT)
Dept: OPHTHALMOLOGY | Facility: CLINIC | Age: 53
End: 2023-06-12
Payer: COMMERCIAL

## 2023-06-12 NOTE — TELEPHONE ENCOUNTER
Talked with patient to schedule 6 week follow up appointment with Juan or Roxanne per Dr. Eng.  Patient was unable to schedule at this time due to being on another call.  Patient will call back.    Usama Enriquez on 6/12/2023 at 9:14 AM

## 2023-06-13 RX ORDER — TOPIRAMATE 50 MG/1
50 TABLET, FILM COATED ORAL 2 TIMES DAILY
Qty: 60 TABLET | Refills: 1 | Status: SHIPPED | OUTPATIENT
Start: 2023-06-13 | End: 2023-08-08

## 2023-07-15 NOTE — TELEPHONE ENCOUNTER
RECORDS RECEIVED FROM:    REASON FOR VISIT: Headaches/Migraines    Date of Appt: 9/20/2023   NOTES (FOR ALL VISITS) STATUS DETAILS   OFFICE NOTE from referring provider Gisel Eng-6/9/2023   OFFICE NOTE from other specialist [facility] ED Visit-5/2/2023   DISCHARGE SUMMARY from hospital     DISCHARGE REPORT from the ER     OPERATIVE REPORT     [facility] (MS ONLY)     EMG     EEG     MEDICATION LIST     IMAGING  (FOR ALL VISITS)     LUMBAR PUNCTURE     HESHAM SCAN (MOVEMENT)     ULTRASOUND (CAROTID BILAT) *VASCULAR*     MRI (HEAD, NECK, SPINE) PACS  MR Brain-5/2/2023    MRA.MRV Brain-5/2/2023   CT (HEAD, NECK, SPINE)

## 2023-07-19 ENCOUNTER — TELEPHONE (OUTPATIENT)
Dept: NEUROLOGY | Facility: CLINIC | Age: 53
End: 2023-07-19

## 2023-07-19 NOTE — PROGRESS NOTES
Chacha Mae is a 52 year old female with the following diagnoses:   1. Papilledema    2. Visual field defect    3. Idiopathic intracranial hypertension           Patient was sent for consultation by Dr. Eng for management of idiopathic intracranial hypertension (IIH). Per her last visit the plan was to  Increase Diamox from 1000 mg ER BID to 1500 AM, 1000 PM. Start Topamax 50 mg QHS for 1 week, then increase to 50 mg BID, Start sodium bicarbonate 1300 mg BID    HPI:  Since her last visit with Dr. Eng 2023 the bicarb addition has helped a lot with her symptoms of SOB and CP. Patient is able to walk up stairs without becoming SOB. Patient is still having daily headaches towards the end of the day. Not having any problems with side effects from adding the topamax. Also gets migraines that is manageable with ibuprofen and ice. Migraines seems to be more associated with her menstrual cycle. Patient has been off work for a month to see how her body reacts with the change in medications. Pulsatile tinnitus has reduced in intensity by about 70% per patient. Denies any TVO.       Independent historians:  Patient    Review of outside testin/8/23: IR LP with OP 30 cm H2O, no nucleated cells, protein 29.1, glucose 67, normal cytology/flow cytometry       23: MRI Brain/Orbits WWO + MRV Head + MRA Head/Neck     IMPRESSION:  1. Partially empty sella turcica, distention of the bilateral optic  nerve sheaths, and bilateral transverse sinus narrowing raises concern  for idiopathic intracranial hypertension. Further workup could be  pursued.  2. Otherwise, unremarkable MRI of the brain and orbits.     IMPRESSION: Bilateral transverse sinus narrowing raises concern for  idiopathic intracranial hypertension.     IMPRESSION: Unremarkable MRA of the head.     IMPRESSION: Unremarkable MRA of the neck.       23: Hgb 13.3    My interpretation performed today of outside testing:  I have independently reviewed  MRI Brain and LP results performed 7/20/2023. Agree with partially empty sella on MRI Brain and sinus stenosis on MRV Brain likely contributing to patient's symptoms and papilledema.         Review of outside clinical notes:    6/9/23 -- Visit with Dr. Eng       1. Idiopathic intracranial hypertension     Patient with chronic positional headache and other symptoms suggestive of intracranial hypertension for several years. She denies prior history of optic disc edema, but had not seen an eye doctor since the start of COVID. She was seen in 5/2023 with significant disc edema OD and optic atrophy OS, raising suspicion for a Foster Apollo picture. Her MRI found no mass or indication of an infiltrative process to account for these changes. Both the MRI and MRV showed stigmata of intracranial hypertension without suggestion of a secondary etiology. Although her age is atypical for idiopathic intracranial hypertension, I am reassured that there was no indication of fistula on MRA and that her CSF studies (including cytology and flow cytometry) were unremarkable.      Today she has some fluctuating symptoms, but no definitive progression of afferent deficits. It is difficult to compare GTOP to outside HVF. I am concerned, however, that her RNFL thickness has not appreciably changed in either eye after careful resegmentation of these images. We discussed that escalation of medical therapy would be appropriate before considering other options. Will increase Diamox and start Topamax as below, with sodium bicarbonate to ameliorate side-effects. We discussed that Topiramate is a teratogen and that adequate contraception is essential, even in perimenopause, and that other risks include angle closure glaucoma and nephrolithiasis. She verbalizes understanding.      Alternative diagnoses include sequential NAION, which I do not suspect based on stable-appearing RNFL edema in the right eye and with her MRI/LP corroborating  intracranial hypertension. Other papillopathy or papillitis is conceivable, but less likely for similar reasons.      Plan:    Increase Diamox from 1000 mg ER BID to 1500 AM, 1000 PM    Start Topamax 50 mg QHS for 1 week, then increase to 50 mg BID    Start sodium bicarbonate 1300 mg BID    Past medical history:    PTSD (post-traumatic stress disorder) F43.10   Major depressive disorder, recurrent, in full remission (HC) F33.42   Attention deficit hyperactivity disorder (ADHD), combined type F90.2   Irritable bowel syndrome with constipation K58.1   Non morbid obesity due to excess calories E66.09   Hypercholesteremia E78.00       Medications:   acetaZOLAMIDE  atorvastatin  ibuprofen  oxyCODONE-acetaminophen  QUEtiapine Tb24  sertraline  sodium bicarbonate  topiramate  Wegovy Soaj      Exam:  Visual acuity 20/20 OU.  Color vision 11/11 OU.  Pupils rAPD  Intraocular pressure wnl OU.  Anterior segment exam wnl for age.  Fundus exam     Tests ordered and interpreted today:  OCT N: 124 -- 114 today improved compared to last visit OD               74 --73 today stable compared to last visit OS     VF: improvement in field defects now mostly inferior defects OD, improvement in density of field defects but still with significant superior and inferior arcuate type defects OS.       Discussion of management / interpretation with another provider:   None    Assessment/Plan:     It is my impression that patient has IIH, Improvement in optic disc swelling OD on exam and on OCT N today. she is have a lot of side effects from the diamox.  She has lost about 10 lbs recently. She was taking Wegovy but due the shortage she is now on Saxenda.  Will reduce diamox to 1000 mg twice a day and continue topiramate 50 twice a day. Can stay on sodium bicarb 1300 twice a day.               Attending Physician Attestation:  Complete documentation of historical and exam elements from today's encounter can be found in the full encounter summary  report (not reduplicated in this progress note).  I personally obtained the chief complaint(s) and history of present illness.  I confirmed and edited as necessary the review of systems, past medical/surgical history, family history, social history, and examination findings as documented by others; and I examined the patient myself.  I personally reviewed the relevant tests, images, and reports as documented above.  I formulated and edited as necessary the assessment and plan and discussed the findings and management plan with the patient and family. I personally reviewed the ophthalmic test(s) associated with this encounter, agree with the interpretation(s) as documented by the resident/fellow, and have edited the corresponding report(s) as necessary.  - Rajat Martinez MD      Precharting:  Hina Zarate MD  Ophthalmology Resident PGY3    Timmy Justice MD   Fellow, Neuro-Ophthalmology

## 2023-07-19 NOTE — TELEPHONE ENCOUNTER
Patient is schedule to see  as a new patient in September and is asking If any of our neurologist see her diagnosis and if she can get a sooner appointment instead of sept? Patient states the medicine she is taking is toxic and is trying to get in sooner to see a neurologist that sees her diagnosis. Please call patient back to advise.

## 2023-07-20 ENCOUNTER — OFFICE VISIT (OUTPATIENT)
Dept: OPHTHALMOLOGY | Facility: CLINIC | Age: 53
End: 2023-07-20
Attending: OPHTHALMOLOGY
Payer: COMMERCIAL

## 2023-07-20 DIAGNOSIS — H53.40 VISUAL FIELD DEFECT: Primary | ICD-10-CM

## 2023-07-20 DIAGNOSIS — H53.40 VISUAL FIELD DEFECT: ICD-10-CM

## 2023-07-20 DIAGNOSIS — H47.10 PAPILLEDEMA: Primary | ICD-10-CM

## 2023-07-20 DIAGNOSIS — G93.2 IDIOPATHIC INTRACRANIAL HYPERTENSION: ICD-10-CM

## 2023-07-20 PROCEDURE — 99214 OFFICE O/P EST MOD 30 MIN: CPT | Mod: GC | Performed by: OPHTHALMOLOGY

## 2023-07-20 PROCEDURE — 92133 CPTRZD OPH DX IMG PST SGM ON: CPT | Performed by: OPHTHALMOLOGY

## 2023-07-20 PROCEDURE — G0463 HOSPITAL OUTPT CLINIC VISIT: HCPCS | Performed by: OPHTHALMOLOGY

## 2023-07-20 PROCEDURE — 92083 EXTENDED VISUAL FIELD XM: CPT | Performed by: OPHTHALMOLOGY

## 2023-07-20 RX ORDER — LIRAGLUTIDE 6 MG/ML
INJECTION, SOLUTION SUBCUTANEOUS
COMMUNITY
Start: 2023-06-15

## 2023-07-20 ASSESSMENT — VISUAL ACUITY
METHOD: SNELLEN - LINEAR
OD_SC: 20/20
OS_SC: 20/20

## 2023-07-20 ASSESSMENT — CONF VISUAL FIELD
OD_NORMAL: 1
OS_NORMAL: 1
METHOD: COUNTING FINGERS
OS_INFERIOR_TEMPORAL_RESTRICTION: 0
OS_SUPERIOR_TEMPORAL_RESTRICTION: 0
OD_SUPERIOR_NASAL_RESTRICTION: 0
OS_SUPERIOR_NASAL_RESTRICTION: 0
OS_INFERIOR_NASAL_RESTRICTION: 0
OD_SUPERIOR_TEMPORAL_RESTRICTION: 0
OD_INFERIOR_TEMPORAL_RESTRICTION: 0
OD_INFERIOR_NASAL_RESTRICTION: 0

## 2023-07-20 ASSESSMENT — SLIT LAMP EXAM - LIDS
COMMENTS: NORMAL
COMMENTS: NORMAL

## 2023-07-20 ASSESSMENT — EXTERNAL EXAM - LEFT EYE: OS_EXAM: NORMAL

## 2023-07-20 ASSESSMENT — EXTERNAL EXAM - RIGHT EYE: OD_EXAM: NORMAL

## 2023-07-20 ASSESSMENT — TONOMETRY
OD_IOP_MMHG: 14
IOP_METHOD: ICARE M/M
OS_IOP_MMHG: 14

## 2023-07-20 ASSESSMENT — CUP TO DISC RATIO
OS_RATIO: -
OD_RATIO: -

## 2023-07-20 NOTE — TELEPHONE ENCOUNTER
Per referral:  Note:    Additional Information:  Idiopathic intracranial hypertension (2nd opinion) prefer Elena Avila or Jen Perez    Will route to provider to make sure she sees this diagnosis.     Carline CARBAJAL RN, BSN  [facility]

## 2023-07-21 ENCOUNTER — TELEPHONE (OUTPATIENT)
Dept: NEUROLOGY | Facility: CLINIC | Age: 53
End: 2023-07-21
Payer: COMMERCIAL

## 2023-07-21 NOTE — TELEPHONE ENCOUNTER
LVM for pt regarding there are no sooner openings and made sure she was on the wait list with Dr. Avila      7/21/23 BD

## 2023-08-07 DIAGNOSIS — H47.10 PAPILLEDEMA: ICD-10-CM

## 2023-08-08 RX ORDER — TOPIRAMATE 50 MG/1
TABLET, FILM COATED ORAL
Qty: 60 TABLET | Refills: 0 | Status: SHIPPED | OUTPATIENT
Start: 2023-08-08

## 2023-08-10 DIAGNOSIS — H47.10 PAPILLEDEMA: Primary | ICD-10-CM

## 2023-08-10 RX ORDER — TOPIRAMATE 50 MG/1
50 TABLET, FILM COATED ORAL 2 TIMES DAILY
Qty: 60 TABLET | Refills: 11 | Status: SHIPPED | OUTPATIENT
Start: 2023-08-10

## 2023-08-25 DIAGNOSIS — H47.10 OPTIC DISC EDEMA: ICD-10-CM

## 2023-08-25 RX ORDER — ACETAZOLAMIDE 500 MG/1
1000 CAPSULE, EXTENDED RELEASE ORAL 2 TIMES DAILY
Qty: 120 CAPSULE | Refills: 5 | Status: SHIPPED | OUTPATIENT
Start: 2023-08-25 | End: 2024-03-07

## 2023-08-25 NOTE — TELEPHONE ENCOUNTER
Will reduce diamox to 1000 mg twice a day and continue topiramate 50 twice a day.     Above per last note from Dr. Martinez 7-20-23    Rx sent     Note to Dr. Juan Wilson RN 12:36 PM 08/25/23

## 2023-09-20 ENCOUNTER — PRE VISIT (OUTPATIENT)
Dept: NEUROLOGY | Facility: CLINIC | Age: 53
End: 2023-09-20

## 2024-03-07 DIAGNOSIS — H47.10 OPTIC DISC EDEMA: ICD-10-CM

## 2024-03-07 RX ORDER — ACETAZOLAMIDE 500 MG/1
1000 CAPSULE, EXTENDED RELEASE ORAL 2 TIMES DAILY
Qty: 120 CAPSULE | Refills: 2 | Status: SHIPPED | OUTPATIENT
Start: 2024-03-07

## 2024-03-07 NOTE — TELEPHONE ENCOUNTER
acetaZOLAMIDE ER Oral Capsule Extended Release 12 Hour 500 MG   Last Written Prescription Date:  8/25/2023  Last Fill Quantity: 120,   # refills: 5  Last Office Visit : 7/20/2023  Future Office visit:  None    Routing refill request to provider for review/approval because:  Okay to refill?   Would Provider like a follow up visit?  Please review and fill per Providers orders for Pt care.     Monika Box RN  Central Triage Red Flags/Med Refills    Assessment/Plan:   7/20/2023  It is my impression that patient has IIH, Improvement in optic disc swelling OD on exam and on OCT N today. she is have a lot of side effects from the diamox.  She has lost about 10 lbs recently. She was taking Wegovy but due the shortage she is now on Saxenda.  Will reduce diamox to 1000 mg twice a day and continue topiramate 50 twice a day. Can stay on sodium bicarb 1300 twice a day.

## 2024-03-08 NOTE — TELEPHONE ENCOUNTER
This patient should have a follow up with Dr. Martinez at his next available appointment. Thank you

## 2024-03-12 NOTE — TELEPHONE ENCOUNTER
Spoke with patient. She has been continuing care at [facility]. She said her pharmacy reached out to us for refill by mistake. She will be working with her [facility] provider for continuation of care and these medications.    Jasmyn Held on 3/12/2024 at 10:15 AM

## 2024-03-25 ENCOUNTER — LAB REQUISITION (OUTPATIENT)
Dept: LAB | Facility: CLINIC | Age: 54
End: 2024-03-25

## 2024-03-25 DIAGNOSIS — L65.9 NONSCARRING HAIR LOSS, UNSPECIFIED: ICD-10-CM

## 2024-03-25 LAB
ALBUMIN SERPL BCG-MCNC: 4.6 G/DL (ref 3.5–5.2)
ALP SERPL-CCNC: 68 U/L (ref 40–150)
ALT SERPL W P-5'-P-CCNC: 18 U/L (ref 0–50)
ANION GAP SERPL CALCULATED.3IONS-SCNC: 11 MMOL/L (ref 7–15)
AST SERPL W P-5'-P-CCNC: 15 U/L (ref 0–45)
BASOPHILS # BLD AUTO: 0 10E3/UL (ref 0–0.2)
BASOPHILS NFR BLD AUTO: 1 %
BILIRUB SERPL-MCNC: <0.2 MG/DL
BUN SERPL-MCNC: 15.1 MG/DL (ref 6–20)
CALCIUM SERPL-MCNC: 9.1 MG/DL (ref 8.6–10)
CHLORIDE SERPL-SCNC: 111 MMOL/L (ref 98–107)
CREAT SERPL-MCNC: 0.58 MG/DL (ref 0.51–0.95)
DEPRECATED HCO3 PLAS-SCNC: 16 MMOL/L (ref 22–29)
EGFRCR SERPLBLD CKD-EPI 2021: >90 ML/MIN/1.73M2
EOSINOPHIL # BLD AUTO: 0 10E3/UL (ref 0–0.7)
EOSINOPHIL NFR BLD AUTO: 1 %
ERYTHROCYTE [DISTWIDTH] IN BLOOD BY AUTOMATED COUNT: 13.5 % (ref 10–15)
FERRITIN SERPL-MCNC: 39 NG/ML (ref 11–328)
GLUCOSE SERPL-MCNC: 102 MG/DL (ref 70–99)
HCT VFR BLD AUTO: 37.5 % (ref 35–47)
HGB BLD-MCNC: 12.3 G/DL (ref 11.7–15.7)
IMM GRANULOCYTES # BLD: 0 10E3/UL
IMM GRANULOCYTES NFR BLD: 0 %
IRON BINDING CAPACITY (ROCHE): 317 UG/DL (ref 240–430)
IRON SATN MFR SERPL: 12 % (ref 15–46)
IRON SERPL-MCNC: 37 UG/DL (ref 37–145)
LYMPHOCYTES # BLD AUTO: 1.4 10E3/UL (ref 0.8–5.3)
LYMPHOCYTES NFR BLD AUTO: 29 %
MCH RBC QN AUTO: 28.3 PG (ref 26.5–33)
MCHC RBC AUTO-ENTMCNC: 32.8 G/DL (ref 31.5–36.5)
MCV RBC AUTO: 86 FL (ref 78–100)
MONOCYTES # BLD AUTO: 0.3 10E3/UL (ref 0–1.3)
MONOCYTES NFR BLD AUTO: 6 %
NEUTROPHILS # BLD AUTO: 3.2 10E3/UL (ref 1.6–8.3)
NEUTROPHILS NFR BLD AUTO: 63 %
NRBC # BLD AUTO: 0 10E3/UL
NRBC BLD AUTO-RTO: 0 /100
PLATELET # BLD AUTO: 211 10E3/UL (ref 150–450)
POTASSIUM SERPL-SCNC: 4 MMOL/L (ref 3.4–5.3)
PROT SERPL-MCNC: 6.9 G/DL (ref 6.4–8.3)
RBC # BLD AUTO: 4.35 10E6/UL (ref 3.8–5.2)
SODIUM SERPL-SCNC: 138 MMOL/L (ref 135–145)
TRANSFERRIN SERPL-MCNC: 270 MG/DL (ref 200–360)
TSH SERPL DL<=0.005 MIU/L-ACNC: 3.12 UIU/ML (ref 0.3–4.2)
WBC # BLD AUTO: 4.9 10E3/UL (ref 4–11)

## 2024-03-25 PROCEDURE — 83550 IRON BINDING TEST: CPT | Performed by: FAMILY MEDICINE

## 2024-03-25 PROCEDURE — 80053 COMPREHEN METABOLIC PANEL: CPT | Performed by: FAMILY MEDICINE

## 2024-03-25 PROCEDURE — 85025 COMPLETE CBC W/AUTO DIFF WBC: CPT | Performed by: FAMILY MEDICINE

## 2024-03-25 PROCEDURE — 84466 ASSAY OF TRANSFERRIN: CPT | Performed by: FAMILY MEDICINE

## 2024-03-25 PROCEDURE — 84443 ASSAY THYROID STIM HORMONE: CPT | Performed by: FAMILY MEDICINE

## 2024-03-25 PROCEDURE — 82728 ASSAY OF FERRITIN: CPT | Performed by: FAMILY MEDICINE

## 2024-06-25 ENCOUNTER — LAB REQUISITION (OUTPATIENT)
Dept: LAB | Facility: CLINIC | Age: 54
End: 2024-06-25

## 2024-06-25 DIAGNOSIS — Z13.9 ENCOUNTER FOR SCREENING, UNSPECIFIED: ICD-10-CM

## 2024-06-25 LAB
ANION GAP SERPL CALCULATED.3IONS-SCNC: 9 MMOL/L (ref 7–15)
BUN SERPL-MCNC: 33.5 MG/DL (ref 6–20)
CALCIUM SERPL-MCNC: 9.1 MG/DL (ref 8.6–10)
CHLORIDE SERPL-SCNC: 115 MMOL/L (ref 98–107)
CREAT SERPL-MCNC: 0.62 MG/DL (ref 0.51–0.95)
DEPRECATED HCO3 PLAS-SCNC: 17 MMOL/L (ref 22–29)
EGFRCR SERPLBLD CKD-EPI 2021: >90 ML/MIN/1.73M2
GLUCOSE SERPL-MCNC: 111 MG/DL (ref 70–99)
POTASSIUM SERPL-SCNC: 4 MMOL/L (ref 3.4–5.3)
SODIUM SERPL-SCNC: 141 MMOL/L (ref 135–145)

## 2024-06-25 PROCEDURE — 80048 BASIC METABOLIC PNL TOTAL CA: CPT | Performed by: FAMILY MEDICINE

## 2024-06-27 ENCOUNTER — LAB REQUISITION (OUTPATIENT)
Dept: LAB | Facility: CLINIC | Age: 54
End: 2024-06-27

## 2024-06-27 DIAGNOSIS — E78.5 HYPERLIPIDEMIA, UNSPECIFIED: ICD-10-CM

## 2024-06-27 DIAGNOSIS — R73.03 PREDIABETES: ICD-10-CM

## 2024-06-27 LAB
ALBUMIN SERPL BCG-MCNC: 4.4 G/DL (ref 3.5–5.2)
ALP SERPL-CCNC: 73 U/L (ref 40–150)
ALT SERPL W P-5'-P-CCNC: 17 U/L (ref 0–50)
ANION GAP SERPL CALCULATED.3IONS-SCNC: 10 MMOL/L (ref 7–15)
AST SERPL W P-5'-P-CCNC: 16 U/L (ref 0–45)
BILIRUB SERPL-MCNC: <0.2 MG/DL
BUN SERPL-MCNC: 26.9 MG/DL (ref 6–20)
CALCIUM SERPL-MCNC: 9 MG/DL (ref 8.6–10)
CHLORIDE SERPL-SCNC: 114 MMOL/L (ref 98–107)
CHOLEST SERPL-MCNC: 121 MG/DL
CREAT SERPL-MCNC: 0.54 MG/DL (ref 0.51–0.95)
DEPRECATED HCO3 PLAS-SCNC: 17 MMOL/L (ref 22–29)
EGFRCR SERPLBLD CKD-EPI 2021: >90 ML/MIN/1.73M2
FASTING STATUS PATIENT QL REPORTED: NO
GLUCOSE SERPL-MCNC: 108 MG/DL (ref 70–99)
HBA1C MFR BLD: 5.6 %
HDLC SERPL-MCNC: 43 MG/DL
LDLC SERPL CALC-MCNC: 55 MG/DL
NONHDLC SERPL-MCNC: 78 MG/DL
POTASSIUM SERPL-SCNC: 3.7 MMOL/L (ref 3.4–5.3)
PROT SERPL-MCNC: 7 G/DL (ref 6.4–8.3)
SODIUM SERPL-SCNC: 141 MMOL/L (ref 135–145)
TRIGL SERPL-MCNC: 114 MG/DL

## 2024-06-27 PROCEDURE — 80053 COMPREHEN METABOLIC PANEL: CPT | Performed by: FAMILY MEDICINE

## 2024-06-27 PROCEDURE — 83036 HEMOGLOBIN GLYCOSYLATED A1C: CPT | Performed by: FAMILY MEDICINE

## 2024-06-27 PROCEDURE — 82465 ASSAY BLD/SERUM CHOLESTEROL: CPT | Performed by: FAMILY MEDICINE

## 2024-06-30 ENCOUNTER — HEALTH MAINTENANCE LETTER (OUTPATIENT)
Age: 54
End: 2024-06-30

## 2024-07-19 ENCOUNTER — LAB REQUISITION (OUTPATIENT)
Dept: LAB | Facility: CLINIC | Age: 54
End: 2024-07-19
Payer: COMMERCIAL

## 2024-07-19 DIAGNOSIS — Z12.4 ENCOUNTER FOR SCREENING FOR MALIGNANT NEOPLASM OF CERVIX: ICD-10-CM

## 2024-07-19 PROCEDURE — G0145 SCR C/V CYTO,THINLAYER,RESCR: HCPCS | Performed by: STUDENT IN AN ORGANIZED HEALTH CARE EDUCATION/TRAINING PROGRAM

## 2024-07-19 PROCEDURE — 87624 HPV HI-RISK TYP POOLED RSLT: CPT | Performed by: STUDENT IN AN ORGANIZED HEALTH CARE EDUCATION/TRAINING PROGRAM

## 2024-07-22 LAB
HPV HR 12 DNA CVX QL NAA+PROBE: NEGATIVE
HPV16 DNA CVX QL NAA+PROBE: NEGATIVE
HPV18 DNA CVX QL NAA+PROBE: NEGATIVE
HUMAN PAPILLOMA VIRUS FINAL DIAGNOSIS: NORMAL

## 2024-07-25 LAB
BKR LAB AP GYN ADEQUACY: NORMAL
BKR LAB AP GYN INTERPRETATION: NORMAL
BKR LAB AP LMP: NORMAL
BKR LAB AP PREVIOUS ABNL DX: NORMAL
BKR LAB AP PREVIOUS ABNORMAL: NORMAL
PATH REPORT.COMMENTS IMP SPEC: NORMAL
PATH REPORT.COMMENTS IMP SPEC: NORMAL
PATH REPORT.RELEVANT HX SPEC: NORMAL

## 2024-12-03 ENCOUNTER — LAB REQUISITION (OUTPATIENT)
Dept: LAB | Facility: CLINIC | Age: 54
End: 2024-12-03

## 2024-12-03 DIAGNOSIS — R73.03 PREDIABETES: ICD-10-CM

## 2024-12-03 LAB
EST. AVERAGE GLUCOSE BLD GHB EST-MCNC: 114 MG/DL
HBA1C MFR BLD: 5.6 %

## 2024-12-03 PROCEDURE — 83036 HEMOGLOBIN GLYCOSYLATED A1C: CPT | Performed by: FAMILY MEDICINE

## 2025-07-17 ENCOUNTER — OFFICE VISIT (OUTPATIENT)
Dept: OPHTHALMOLOGY | Facility: CLINIC | Age: 55
End: 2025-07-17
Attending: OPHTHALMOLOGY
Payer: COMMERCIAL

## 2025-07-17 DIAGNOSIS — G93.2 IDIOPATHIC INTRACRANIAL HYPERTENSION: ICD-10-CM

## 2025-07-17 DIAGNOSIS — R51.9 GENERALIZED HEADACHE: ICD-10-CM

## 2025-07-17 DIAGNOSIS — H47.20 OPTIC ATROPHY: Primary | ICD-10-CM

## 2025-07-17 PROCEDURE — 99212 OFFICE O/P EST SF 10 MIN: CPT | Performed by: OPHTHALMOLOGY

## 2025-07-17 PROCEDURE — 92133 CPTRZD OPH DX IMG PST SGM ON: CPT | Performed by: OPHTHALMOLOGY

## 2025-07-17 PROCEDURE — 92083 EXTENDED VISUAL FIELD XM: CPT | Performed by: OPHTHALMOLOGY

## 2025-07-17 RX ORDER — METFORMIN HYDROCHLORIDE 500 MG/1
1500 TABLET, EXTENDED RELEASE ORAL DAILY
COMMUNITY
Start: 2025-06-17

## 2025-07-17 RX ORDER — TOPIRAMATE 25 MG/1
25 TABLET, FILM COATED ORAL DAILY
Qty: 90 TABLET | Refills: 3 | Status: SHIPPED | OUTPATIENT
Start: 2025-07-17

## 2025-07-17 ASSESSMENT — VISUAL ACUITY
METHOD: SNELLEN - LINEAR
OS_SC: 20/20
OD_SC: 20/25
OD_SC+: +2
CORRECTION_TYPE: GLASSES

## 2025-07-17 ASSESSMENT — CONF VISUAL FIELD
METHOD: COUNTING FINGERS
OD_SUPERIOR_TEMPORAL_RESTRICTION: 0
OD_INFERIOR_TEMPORAL_RESTRICTION: 0
OD_INFERIOR_NASAL_RESTRICTION: 0
OS_SUPERIOR_TEMPORAL_RESTRICTION: 0
OS_SUPERIOR_NASAL_RESTRICTION: 0
OD_SUPERIOR_NASAL_RESTRICTION: 0
OS_INFERIOR_NASAL_RESTRICTION: 0
OS_INFERIOR_TEMPORAL_RESTRICTION: 0
OS_NORMAL: 1
OD_NORMAL: 1

## 2025-07-17 ASSESSMENT — REFRACTION_WEARINGRX
OS_CYLINDER: +1.00
OD_ADD: +2.25
OD_SPHERE: -0.25
OS_AXIS: 061
OD_AXIS: 088
OD_CYLINDER: +0.75
OS_ADD: +2.25
OS_SPHERE: -0.75

## 2025-07-17 ASSESSMENT — TONOMETRY
OS_IOP_MMHG: 14
OD_IOP_MMHG: 17
IOP_METHOD: ICARE

## 2025-07-17 ASSESSMENT — EXTERNAL EXAM - LEFT EYE: OS_EXAM: NORMAL

## 2025-07-17 ASSESSMENT — CUP TO DISC RATIO
OS_RATIO: 0
OD_RATIO: 0

## 2025-07-17 ASSESSMENT — SLIT LAMP EXAM - LIDS
COMMENTS: NORMAL
COMMENTS: NORMAL

## 2025-07-17 ASSESSMENT — EXTERNAL EXAM - RIGHT EYE: OD_EXAM: NORMAL

## 2025-07-17 NOTE — PROGRESS NOTES
Chacha Mae is a 54 year old female with the following diagnoses:   1. Optic atrophy    2. Idiopathic intracranial hypertension    3. Generalized headache         Patient follows for idiopathic intracranial hypertension (first diagnosed in 2023). Since last visit on 7/20/23, patient reports she has been receiving her care at [facility]. Today, she reports pulsatile tinnitus in right ear, headaches, blurry vision, sinus pain, seeing stars when bending over for about 1 month.  She has constant headaches, most consistently in the mornings and then during the day. She reports constant teeth grinding. She takes 50 mg topiramate x2 daily.      The max dose that she was previously on was Diamox 1500 mg ER BID in the morning and 1000 mg in the evening, along with topamax 50 mg twice a day.     She has lost 20% of her weight and had lifestyle changes, noting she had improvement in symptoms.     She would like a referral for neurology.     Most recent note from [facility] with Dr. Renetta Bedoya (1/29/25):         Exam:  Visual acuity 20/25+2 right eye 20/20 left eye.  Color vision is full in both eyes.  Pupils: no APD, round and reactive.  Intraocular pressure 17 right eye and 14 left eye.  Anterior segment exam unremarkalbe.  Fundus exam was remarkable for 1+ disc edema with blurring of nasal margin in both eyes.     Tests ordered and interpreted today:    OCT RNFL:  Right eye: reliable, mean thickness 73 (from 113 in 2023)   Left eye: reliable, mean thickness 74 (from 72 in 2023)     Octopus automated 30 degree visual field  In the right eye: reliable, full field   In the left eye:   reliable, some field deficits     Virtual Vision VF OU          Performed by: Cheyanne   . Patient cooperation: Reliable   . 24-2 Fast.   Good Fixation, Dilated After VF.     Notes  Good reliability both eyes, nasal step LEFT eye, normal RIGHT eye          OCT Optic Nerve RNFL Spectralis OU (both eyes)          Performed by: BENNETT Carranza  Eye  Reliability of the test: Good   . Test Findings: Abnormal   . Interpretation: Nasal loss, Inferior loss   . Plan: Monitor   . Interval: Better   .     Left Eye  Reliability of the test: Good   . Interpretation: Temporal loss, Superior loss   . Plan: Monitor   . Interval: Same   .            Assessment & Plan    The patient is a 54-year-old female with a history of idiopathic intracranial hypertension (IIH), generalized headache, and optic atrophy who presents with a return of symptoms including right-sided tinnitus, constant headaches, blurry vision, sinus pain, and teeth grinding. Her IIH appears to have improved, likely due to significant weight loss and ongoing use of topiramate, as evidenced by the stability of her optic discs and the absence of acute vision loss. However, she continues to experience persistent headaches, which are common in patients with IIH even after intracranial pressure normalizes. Differential diagnosis for her headaches includes ongoing sequelae of IIH, medication overuse, sinus-related headaches, and possible bruxism-related tension headaches. The return of tinnitus and visual symptoms raises concern for possible recurrence or fluctuation of IIH, but current examination does not show acute worsening of optic disc edema.    Given her persistent symptoms and desire for further management, a referral to neurology is indicated for comprehensive evaluation and to explore additional treatment options for her headaches. Increasing the dose of topiramate to 75 mg twice daily is recommended while awaiting neurology consultation, as the patient reports benefit from this medication. The rationale for increasing the dose is to optimize headache control, as topiramate is used prophylactically for headache management. The patient will be provided with a prescription for 25 mg tablets to facilitate the dose adjustment. She is advised to monitor for any new or worsening symptoms, particularly  changes in vision, and to seek urgent care if these occur. The patient is also encouraged to answer calls from the neurology office to expedite her referral process.    Prescriptions Ordered:    - Topiramate 75 mg, twice daily, 90-day supply    Appointments:    - Referral to neurology for headache management    Consent was obtained from the patient to use an AI documentation tool in the creation of this note. This document was created with the help of Elsa mills         Attending Physician Attestation:  Complete documentation of historical and exam elements from today's encounter can be found in the full encounter summary report (not reduplicated in this progress note).  I personally obtained the chief complaint(s) and history of present illness.  I confirmed and edited as necessary the review of systems, past medical/surgical history, family history, social history, and examination findings as documented by others; and I examined the patient myself.  I personally reviewed the relevant tests, images, and reports as documented above.  I formulated and edited as necessary the assessment and plan and discussed the findings and management plan with the patient and family. I was present with the medical student who participated in the service and in the documentation of this note. - MD Monika Vaca, MS4     Precharting:  Monika Chi, MS4

## 2025-07-21 ENCOUNTER — PATIENT OUTREACH (OUTPATIENT)
Dept: CARE COORDINATION | Facility: CLINIC | Age: 55
End: 2025-07-21
Payer: COMMERCIAL

## 2025-08-24 ENCOUNTER — HEALTH MAINTENANCE LETTER (OUTPATIENT)
Age: 55
End: 2025-08-24

## 2025-10-21 ENCOUNTER — PRE VISIT (OUTPATIENT)
Dept: NEUROLOGY | Facility: CLINIC | Age: 55
End: 2025-10-21

## (undated) RX ORDER — LIDOCAINE HYDROCHLORIDE 10 MG/ML
INJECTION, SOLUTION EPIDURAL; INFILTRATION; INTRACAUDAL; PERINEURAL
Status: DISPENSED
Start: 2023-05-08